# Patient Record
Sex: FEMALE | Race: WHITE | Employment: UNEMPLOYED | URBAN - NONMETROPOLITAN AREA
[De-identification: names, ages, dates, MRNs, and addresses within clinical notes are randomized per-mention and may not be internally consistent; named-entity substitution may affect disease eponyms.]

---

## 2017-12-22 ENCOUNTER — HOSPITAL ENCOUNTER (OUTPATIENT)
Age: 42
Discharge: HOME | End: 2017-12-22
Payer: COMMERCIAL

## 2017-12-22 LAB
CHOLESTEROL/HDL RATIO: 3.2
CHOLESTEROL: 200 MG/DL
ESTIMATED AVERAGE GLUCOSE: 91 MG/DL
HBA1C MFR BLD: 4.8 % (ref 4.8–5.9)
HCT VFR BLD CALC: 43.7 % (ref 36–46)
HDLC SERPL-MCNC: 62 MG/DL
HEMOGLOBIN: 14.3 G/DL (ref 12–16)
LDL CHOLESTEROL: 127 MG/DL (ref 0–130)
MCH RBC QN AUTO: 28.9 PG (ref 26–34)
MCHC RBC AUTO-ENTMCNC: 32.6 G/DL (ref 31–37)
MCV RBC AUTO: 88.7 FL (ref 80–100)
PDW BLD-RTO: 13.4 % (ref 12.1–15.2)
PLATELET # BLD: 237 K/UL (ref 140–450)
PMV BLD AUTO: 7.7 FL (ref 6–12)
RBC # BLD: 4.93 M/UL (ref 4–5.2)
T3 FREE: 2.7 PG/ML (ref 2.02–4.43)
T3 TOTAL: 106 NG/DL (ref 80–200)
T4 TOTAL: 8.1 UG/DL (ref 4.5–12)
THYROXINE, FREE: 1.21 NG/DL (ref 0.93–1.7)
TRIGL SERPL-MCNC: 53 MG/DL
TSH SERPL DL<=0.05 MIU/L-ACNC: 1.46 MIU/L (ref 0.3–5)
VLDLC SERPL CALC-MCNC: ABNORMAL MG/DL (ref 1–30)
WBC # BLD: 6.1 K/UL (ref 3.5–11)

## 2017-12-22 PROCEDURE — 84439 ASSAY OF FREE THYROXINE: CPT

## 2017-12-22 PROCEDURE — 83036 HEMOGLOBIN GLYCOSYLATED A1C: CPT

## 2017-12-22 PROCEDURE — 36415 COLL VENOUS BLD VENIPUNCTURE: CPT

## 2017-12-22 PROCEDURE — 80061 LIPID PANEL: CPT

## 2017-12-22 PROCEDURE — 84481 FREE ASSAY (FT-3): CPT

## 2017-12-22 PROCEDURE — 85027 COMPLETE CBC AUTOMATED: CPT

## 2017-12-22 PROCEDURE — 84443 ASSAY THYROID STIM HORMONE: CPT

## 2017-12-22 PROCEDURE — 84480 ASSAY TRIIODOTHYRONINE (T3): CPT

## 2017-12-22 PROCEDURE — 84436 ASSAY OF TOTAL THYROXINE: CPT

## 2022-07-27 ENCOUNTER — HOSPITAL ENCOUNTER (OUTPATIENT)
Dept: WOMENS IMAGING | Age: 47
Discharge: HOME | End: 2022-07-29
Payer: COMMERCIAL

## 2022-07-27 DIAGNOSIS — Z78.0 MENOPAUSE: ICD-10-CM

## 2022-07-27 DIAGNOSIS — Z12.31 ENCOUNTER FOR SCREENING MAMMOGRAM FOR MALIGNANT NEOPLASM OF BREAST: ICD-10-CM

## 2022-07-27 PROCEDURE — 77080 DXA BONE DENSITY AXIAL: CPT

## 2022-07-27 PROCEDURE — 77063 BREAST TOMOSYNTHESIS BI: CPT

## 2022-08-19 ENCOUNTER — HOSPITAL ENCOUNTER (OUTPATIENT)
Age: 47
Discharge: HOME | End: 2022-08-19
Payer: COMMERCIAL

## 2022-08-19 LAB
CHOLESTEROL/HDL RATIO: 3.5
CHOLESTEROL: 231 MG/DL
HCT VFR BLD CALC: 45.6 % (ref 36.3–47.1)
HDLC SERPL-MCNC: 66 MG/DL
HEMOGLOBIN: 14.7 G/DL (ref 11.9–15.1)
LDL CHOLESTEROL: 147 MG/DL (ref 0–130)
MCH RBC QN AUTO: 29.2 PG (ref 25.2–33.5)
MCHC RBC AUTO-ENTMCNC: 32.2 G/DL (ref 28.4–34.8)
MCV RBC AUTO: 90.7 FL (ref 82.6–102.9)
NRBC AUTOMATED: 0 PER 100 WBC
PDW BLD-RTO: 12.7 % (ref 11.8–14.4)
PLATELET # BLD: 223 K/UL (ref 138–453)
PMV BLD AUTO: 10 FL (ref 8.1–13.5)
RBC # BLD: 5.03 M/UL (ref 3.95–5.11)
TRIGL SERPL-MCNC: 90 MG/DL
TSH SERPL DL<=0.05 MIU/L-ACNC: 2.3 UIU/ML (ref 0.3–5)
VITAMIN D 25-HYDROXY: 29.3 NG/ML
WBC # BLD: 5.9 K/UL (ref 3.5–11.3)

## 2022-08-19 PROCEDURE — 83036 HEMOGLOBIN GLYCOSYLATED A1C: CPT

## 2022-08-19 PROCEDURE — 85027 COMPLETE CBC AUTOMATED: CPT

## 2022-08-19 PROCEDURE — 84443 ASSAY THYROID STIM HORMONE: CPT

## 2022-08-19 PROCEDURE — 80061 LIPID PANEL: CPT

## 2022-08-19 PROCEDURE — 82306 VITAMIN D 25 HYDROXY: CPT

## 2022-08-19 PROCEDURE — 36415 COLL VENOUS BLD VENIPUNCTURE: CPT

## 2022-08-20 ENCOUNTER — HOSPITAL ENCOUNTER (OUTPATIENT)
Age: 47
Setting detail: SPECIMEN
Discharge: HOME | End: 2022-08-20
Payer: COMMERCIAL

## 2022-08-20 LAB
ESTIMATED AVERAGE GLUCOSE: 97 MG/DL
HBA1C MFR BLD: 5 % (ref 4–6)

## 2022-08-20 PROCEDURE — 82272 OCCULT BLD FECES 1-3 TESTS: CPT

## 2022-08-23 LAB
DATE, STOOL #1: NORMAL
HEMOCCULT SP1 STL QL: NEGATIVE
TIME, STOOL #1: NORMAL

## 2022-08-25 ENCOUNTER — HOSPITAL ENCOUNTER (OUTPATIENT)
Age: 47
Setting detail: SPECIMEN
Discharge: HOME | End: 2022-08-25
Payer: COMMERCIAL

## 2022-08-25 PROCEDURE — 82270 OCCULT BLOOD FECES: CPT

## 2022-08-25 PROCEDURE — 82272 OCCULT BLD FECES 1-3 TESTS: CPT

## 2022-08-29 LAB
DATE, STOOL #1: NORMAL
DATE, STOOL #1: NORMAL
HEMOCCULT SP1 STL QL: NEGATIVE
HEMOCCULT SP1 STL QL: NEGATIVE
TIME, STOOL #1: NORMAL
TIME, STOOL #1: NORMAL

## 2022-09-28 ENCOUNTER — OFFICE VISIT (OUTPATIENT)
Dept: VASCULAR SURGERY | Age: 47
End: 2022-09-28
Payer: COMMERCIAL

## 2022-09-28 VITALS
BODY MASS INDEX: 26.13 KG/M2 | TEMPERATURE: 97.5 F | SYSTOLIC BLOOD PRESSURE: 129 MMHG | RESPIRATION RATE: 18 BRPM | HEART RATE: 75 BPM | WEIGHT: 138.4 LBS | HEIGHT: 61 IN | DIASTOLIC BLOOD PRESSURE: 78 MMHG

## 2022-09-28 DIAGNOSIS — I83.813 VARICOSE VEINS OF BILATERAL LOWER EXTREMITIES WITH PAIN: Primary | ICD-10-CM

## 2022-09-28 PROCEDURE — 99204 OFFICE O/P NEW MOD 45 MIN: CPT | Performed by: INTERNAL MEDICINE

## 2022-09-28 RX ORDER — MULTIVIT-MIN/IRON/FOLIC ACID/K 18-600-40
CAPSULE ORAL
COMMUNITY

## 2022-09-28 RX ORDER — CALCIUM CARBONATE 500(1250)
500 TABLET ORAL DAILY
COMMUNITY

## 2022-09-28 NOTE — PROGRESS NOTES
Kassidy Gomez was seen on 9/28/2022 for   Chief Complaint   Patient presents with    New Patient     Varicose veins with pain   .                             REVIEW OF SYSTEMS    Constitutional Weight: absent, A, Fatigue: absent Fever: absent   HEENT Ears: normal,Visual disturbance: absent Sore throat: absent,    Respiratory Shortness of breath: absent, Cough: absent;, Snoring: absent   Cardivascular Chest pain: absent,  Leg pain: present,Leg swelling:absent, Non-healing wound:absent    GI Diarrhea: absent, Abdominal Pain: absent    Urinary frequency: absent, Urinary incontinence: absent   Musculoskeletal Neck pain: absent, Back pain: absent, Restless Leg:absent     Dermatological Hair loss: absent, Skin changes: absent   Neurological  Sudden Loss of Vision in one eye:absent, Slurred Speech:absent, Weakness on one side of body: absent,Headache: absent   Psychiatric Anxiety: absent, Depression: absent   Hematologic Abnormal bleeding: absent,

## 2022-09-28 NOTE — PATIENT INSTRUCTIONS
SURVEY:    You may be receiving a survey from [employer] regarding your visit today. Please complete the survey to enable us to provide the highest quality of care to you and your family. If you cannot score us a very good on any question, please call the office to discuss how we could have made your experience a very good one. Thank you.

## 2022-09-28 NOTE — PROGRESS NOTES
Brayan Brand was seen on 9/28/2022 for   Chief Complaint   Patient presents with    New Patient     Varicose veins with pain   . 9/28/22 Here today on 1st [facility] Vascular visit for varicose veins. Referred by Ilene Reagan MD Ob Gyn. Many years. Since 20's. Veins got more prominent with pregnancies. Wore support stockings. 3 kids. Mother had vv, but not treated. One of her 4 sisters also has vv. Legs ache after prolonged standing. Doesn't interfere with sleep. No abdominal injury or surgery. No hx of blood clot. Other family members have had. General health has been very good. Farm family.     Social History     Socioeconomic History    Marital status:      Spouse name: Not on file    Number of children: Not on file    Years of education: Not on file    Highest education level: Not on file   Occupational History    Not on file   Tobacco Use    Smoking status: Never    Smokeless tobacco: Never   Vaping Use    Vaping Use: Never used   Substance and Sexual Activity    Alcohol use: Yes     Comment: occasionally    Drug use: Never    Sexual activity: Not on file   Other Topics Concern    Not on file   Social History Narrative    Not on file     Social Determinants of Health     Financial Resource Strain: Not on file   Food Insecurity: Not on file   Transportation Needs: Not on file   Physical Activity: Not on file   Stress: Not on file   Social Connections: Not on file   Intimate Partner Violence: Not on file   Housing Stability: Not on file     Family History   Problem Relation Age of Onset    Cancer Mother     High Blood Pressure Mother     High Blood Pressure Father     Heart Attack Father      Past Medical History:   Diagnosis Date    Antithrombin deficiency [facility])      Current Outpatient Medications   Medication Sig Dispense Refill    calcium carbonate (OSCAL) 500 MG TABS tablet Take 500 mg by mouth daily      Cholecalciferol (VITAMIN D) 50 MCG (2000 UT) CAPS capsule Take by mouth       No current facility-administered medications for this visit. Physical findings:  General- no acute distress, oriented  HEENT - no xanthelasma, external ears normal  Neck- Supple, no thyromegaly  Carotids - no carotid bruits  Lungs - Clear to auscultation. CV- Regular rate and rythym, no murmurs rubs or gallops  Abdomen - Non tender, non distended, no bruits  Skin- warm, dry, no skin breakdown or gangrene  Extremities - no edema, varicose veins both anterior legs, some cluster posterior leg, some upper medial thigh    Pulses Right - Radial 2+  Posterior tibial:    2+  Dorsalis pedis:  2+     Pulses Left -Radial 2+  Posterior tibial:    2+  Dorsalis pedis:  2+      Assessment:  1. Varicose veins of bilateral lower extremities with pain         Plan of care:  Bilateral duplex  Rtc 3 wks   45 min chart review and pt encounter  Follow up and evaluate.        Electronically signed by Williams Conteh MD on 9/28/22 at 3:24 PM EDT

## 2022-10-06 ENCOUNTER — HOSPITAL ENCOUNTER (OUTPATIENT)
Dept: VASCULAR LAB | Age: 47
Discharge: HOME | End: 2022-10-08
Payer: COMMERCIAL

## 2022-10-06 DIAGNOSIS — I83.813 VARICOSE VEINS OF BILATERAL LOWER EXTREMITIES WITH PAIN: ICD-10-CM

## 2022-10-06 PROCEDURE — 93970 EXTREMITY STUDY: CPT

## 2022-10-26 ENCOUNTER — OFFICE VISIT (OUTPATIENT)
Dept: VASCULAR SURGERY | Age: 47
End: 2022-10-26
Payer: COMMERCIAL

## 2022-10-26 VITALS
DIASTOLIC BLOOD PRESSURE: 67 MMHG | HEART RATE: 79 BPM | TEMPERATURE: 97 F | SYSTOLIC BLOOD PRESSURE: 110 MMHG | HEIGHT: 61 IN | RESPIRATION RATE: 18 BRPM | BODY MASS INDEX: 26.38 KG/M2 | WEIGHT: 139.7 LBS

## 2022-10-26 DIAGNOSIS — I83.813 VARICOSE VEINS OF BILATERAL LOWER EXTREMITIES WITH PAIN: Primary | ICD-10-CM

## 2022-10-26 PROCEDURE — 99214 OFFICE O/P EST MOD 30 MIN: CPT | Performed by: INTERNAL MEDICINE

## 2022-10-26 NOTE — PROGRESS NOTES
Apoorva Marcos was seen on 10/26/2022 for   Chief Complaint   Patient presents with    Follow-up     Results of US   .                             REVIEW OF SYSTEMS    Constitutional Weight: absent, A, Fatigue: absent Fever: absent   HEENT Ears: normal,Visual disturbance: absent Sore throat: absent,    Respiratory Shortness of breath: absent, Cough: absent;, Snoring: absent   Cardivascular Chest pain: absent,  Leg pain: present,Leg swelling:absent, Non-healing wound:absent    GI Diarrhea: absent, Abdominal Pain: absent    Urinary frequency: absent, Urinary incontinence: absent   Musculoskeletal Neck pain: absent, Back pain: absent, Restless Leg:absent     Dermatological Hair loss: absent, Skin changes: absent   Neurological  Sudden Loss of Vision in one eye:absent, Slurred Speech:absent, Weakness on one side of body: absent,Headache: absent   Psychiatric Anxiety: absent, Depression: absent   Hematologic Abnormal bleeding: absent,

## 2022-10-26 NOTE — PROGRESS NOTES
Susan García was seen on 10/26/2022 for   Chief Complaint   Patient presents with    Follow-up     Results of US   .9/28/22 Here today on 1st [facility] Vascular visit for varicose veins. Referred by Marlene Nelson MD Ob Gyn. Many years. Since 20's. Veins got more prominent with pregnancies. Wore support stockings. 3 kids. Mother had vv, but not treated. One of her 4 sisters also has vv. Legs ache after prolonged standing. Doesn't interfere with sleep. No abdominal injury or surgery. No hx of blood clot. Other family members have had. General health has been very good. Farm family. UPDATE 10/26/22 No changes since last visit. Duplex showed bilateral vv and reflux, with left gsv supplying vv in lower leg, with reflux.        Social History     Socioeconomic History    Marital status:      Spouse name: Not on file    Number of children: Not on file    Years of education: Not on file    Highest education level: Not on file   Occupational History    Not on file   Tobacco Use    Smoking status: Never    Smokeless tobacco: Never   Vaping Use    Vaping Use: Never used   Substance and Sexual Activity    Alcohol use: Yes     Comment: occasionally    Drug use: Never    Sexual activity: Not on file   Other Topics Concern    Not on file   Social History Narrative    Not on file     Social Determinants of Health     Financial Resource Strain: Not on file   Food Insecurity: Not on file   Transportation Needs: Not on file   Physical Activity: Not on file   Stress: Not on file   Social Connections: Not on file   Intimate Partner Violence: Not on file   Housing Stability: Not on file     Family History   Problem Relation Age of Onset    Cancer Mother     High Blood Pressure Mother     High Blood Pressure Father     Heart Attack Father      Past Medical History:   Diagnosis Date    Antithrombin deficiency [facility])      Current Outpatient Medications   Medication Sig Dispense Refill    calcium carbonate (OSCAL) 500 MG TABS tablet Take 500 mg by mouth daily      Cholecalciferol (VITAMIN D) 50 MCG (2000 UT) CAPS capsule Take by mouth       No current facility-administered medications for this visit. Physical findings:  General- no acute distress, oriented  HEENT - no xanthelasma, external ears normal  Neck- Supple, no thyromegaly  Skin- warm, dry, no skin breakdown or gangrene  Extremities - no edema. Prashanth vv        Assessment:  1. Varicose veins of bilateral lower extremities with pain     Bilateral symptomatic reflux interfering with adl, not responding to compression      Plan of care: Will start with left gsv venaseal (rf ok if not approved) and supplemental foam if needed. Will then plan on right leg later  30 min chart review and pt encounter  Follow up and evaluate.        Electronically signed by Dale Lewis MD on 10/26/22 at 9:12 AM EDT

## 2022-10-26 NOTE — PATIENT INSTRUCTIONS
SURVEY:    You may be receiving a survey from [employer] regarding your visit today. Please complete the survey to enable us to provide the highest quality of care to you and your family. If you cannot score us a very good on any question, please call the office to discuss how we could have made your experience a very good one. Thank you.

## 2022-11-15 ENCOUNTER — APPOINTMENT (OUTPATIENT)
Dept: VASCULAR LAB | Age: 47
End: 2022-11-15
Attending: INTERNAL MEDICINE
Payer: COMMERCIAL

## 2022-11-15 ENCOUNTER — HOSPITAL ENCOUNTER (OUTPATIENT)
Dept: INTERVENTIONAL RADIOLOGY/VASCULAR | Age: 47
Discharge: HOME | End: 2022-11-15
Attending: INTERNAL MEDICINE | Admitting: INTERNAL MEDICINE
Payer: COMMERCIAL

## 2022-11-15 VITALS
RESPIRATION RATE: 16 BRPM | SYSTOLIC BLOOD PRESSURE: 114 MMHG | DIASTOLIC BLOOD PRESSURE: 71 MMHG | OXYGEN SATURATION: 95 % | HEART RATE: 80 BPM | TEMPERATURE: 98.4 F

## 2022-11-15 DIAGNOSIS — M79.662 PAIN OF LEFT LOWER LEG: Primary | ICD-10-CM

## 2022-11-15 PROCEDURE — C1894 INTRO/SHEATH, NON-LASER: HCPCS

## 2022-11-15 PROCEDURE — 2500000003 HC RX 250 WO HCPCS: Performed by: INTERNAL MEDICINE

## 2022-11-15 PROCEDURE — 64999 UNLISTED PX NERVOUS SYSTEM: CPT

## 2022-11-15 PROCEDURE — 2709999900 HC NON-CHARGEABLE SUPPLY

## 2022-11-15 PROCEDURE — C1888 ENDOVAS NON-CARDIAC ABL CATH: HCPCS

## 2022-11-15 PROCEDURE — 2500000003 HC RX 250 WO HCPCS

## 2022-11-15 PROCEDURE — 2580000003 HC RX 258: Performed by: INTERNAL MEDICINE

## 2022-11-15 RX ORDER — LIDOCAINE HYDROCHLORIDE 10 MG/ML
INJECTION, SOLUTION EPIDURAL; INFILTRATION; INTRACAUDAL; PERINEURAL
Status: COMPLETED | OUTPATIENT
Start: 2022-11-15 | End: 2022-11-15

## 2022-11-15 RX ADMIN — LIDOCAINE HYDROCHLORIDE,EPINEPHRINE BITARTRATE: 10; .01 INJECTION, SOLUTION INFILTRATION; PERINEURAL at 09:43

## 2022-11-15 RX ADMIN — LIDOCAINE HYDROCHLORIDE 6 ML: 10 INJECTION, SOLUTION EPIDURAL; INFILTRATION; INTRACAUDAL; PERINEURAL at 09:38

## 2022-11-15 NOTE — H&P
.9/28/22 Here today on 1st [facility] Vascular visit for varicose veins. Referred by David Hanson MD Ob Gyn. Many years. Since 20's. Veins got more prominent with pregnancies. Wore support stockings. 3 kids. Mother had vv, but not treated. One of her 4 sisters also has vv. Legs ache after prolonged standing. Doesn't interfere with sleep. No abdominal injury or surgery. No hx of blood clot. Other family members have had. General health has been very good. Farm family. UPDATE 10/26/22 No changes since last visit. Duplex showed bilateral vv and reflux, with left gsv supplying vv in lower leg, with reflux.          Social History               Socioeconomic History    Marital status:        Spouse name: Not on file    Number of children: Not on file    Years of education: Not on file    Highest education level: Not on file   Occupational History    Not on file   Tobacco Use    Smoking status: Never    Smokeless tobacco: Never   Vaping Use    Vaping Use: Never used   Substance and Sexual Activity    Alcohol use: Yes       Comment: occasionally    Drug use: Never    Sexual activity: Not on file   Other Topics Concern    Not on file   Social History Narrative    Not on file      Social Determinants of Health      Financial Resource Strain: Not on file   Food Insecurity: Not on file   Transportation Needs: Not on file   Physical Activity: Not on file   Stress: Not on file   Social Connections: Not on file   Intimate Partner Violence: Not on file   Housing Stability: Not on file         Family History         Family History   Problem Relation Age of Onset    Cancer Mother      High Blood Pressure Mother      High Blood Pressure Father      Heart Attack Father           Past Medical History        Past Medical History:   Diagnosis Date    Antithrombin deficiency [facility])           Current Facility-Administered Medications          Current Outpatient Medications   Medication Sig Dispense Refill    calcium carbonate (OSCAL) 500 MG TABS tablet Take 500 mg by mouth daily        Cholecalciferol (VITAMIN D) 50 MCG (2000 UT) CAPS capsule Take by mouth          No current facility-administered medications for this visit. Physical findings:  General- no acute distress, oriented  HEENT - no xanthelasma, external ears normal  Neck- Supple, no thyromegaly  Skin- warm, dry, no skin breakdown or gangrene  Extremities - no edema. Prashanth vv           Assessment:  1.  Varicose veins of bilateral lower extremities with pain     Bilateral symptomatic reflux interfering with adl, not responding to compression        Plan of care: Left gsv RF ablation  No changes since prior exam  Yao Joya MD

## 2022-11-15 NOTE — DISCHARGE INSTRUCTIONS
Radiofrequency Sclerotherapy: After Your Procedure  What is sclerotherapy? Sclerotherapy is a treatment to get rid of varicose veins. A chemical is injected along the vessel to numb the area and then radiofrequency treatment is completed. This causes the vein to close. The procedure can take up to 30 minutes. You should be able to walk on your own after the treatment. Follow-up care is a key part of your treatment and safety. Be sure to make and go to all appointments, and call your doctor if you are having problems. It's also a good idea to know your test results and keep a list of the medicines you take. Going home  Be sure you have someone to drive you home. You will be able to return to normal activity as tolerated. Walking is encouraged at regular intervals throughout the day for at least a total of 30 minutes per day. Avoid long periods of sitting or standing. Refrain from strenuous activities or heavy lifting for several days. Resume a regular diet      Wear compression stocking for 1-2 weeks. If necessary you may shower leaving compression hose on and drying thoroughly with hair dryer. If change of stocking is needed, remove soiled stocking and apply clean stocking immediately. Take analgesics as needed for pain. Watch for signs of infection such as:    redness, swelling, drainage and fever. Please notify doctor of these. Follow up vascular studies in 1-2 weeks. When should you call your doctor? You have questions or concerns.

## 2022-11-15 NOTE — PROGRESS NOTES
Discharge instructions reviewed and voices understanding. Vital signs stable. Dressed for home Amb[employer] off department unaided, all belongings sent with patient.

## 2022-11-15 NOTE — SEDATION DOCUMENTATION
Leg cleaned and band aide applied. [place] website --- [email or web address] Website --- [email or web address]

## 2022-11-23 ENCOUNTER — HOSPITAL ENCOUNTER (OUTPATIENT)
Dept: VASCULAR LAB | Age: 47
Discharge: HOME | End: 2022-11-25
Payer: COMMERCIAL

## 2022-11-23 ENCOUNTER — OFFICE VISIT (OUTPATIENT)
Dept: VASCULAR SURGERY | Age: 47
End: 2022-11-23
Payer: COMMERCIAL

## 2022-11-23 VITALS
HEIGHT: 62 IN | RESPIRATION RATE: 16 BRPM | DIASTOLIC BLOOD PRESSURE: 74 MMHG | HEART RATE: 67 BPM | TEMPERATURE: 97.5 F | WEIGHT: 136.5 LBS | SYSTOLIC BLOOD PRESSURE: 131 MMHG | BODY MASS INDEX: 25.12 KG/M2

## 2022-11-23 DIAGNOSIS — I83.813 VARICOSE VEINS OF BILATERAL LOWER EXTREMITIES WITH PAIN: Primary | ICD-10-CM

## 2022-11-23 DIAGNOSIS — M79.662 PAIN OF LEFT LOWER LEG: ICD-10-CM

## 2022-11-23 PROCEDURE — 99215 OFFICE O/P EST HI 40 MIN: CPT | Performed by: INTERNAL MEDICINE

## 2022-11-23 PROCEDURE — 93971 EXTREMITY STUDY: CPT

## 2022-11-23 NOTE — PATIENT INSTRUCTIONS
SURVEY:    You may be receiving a survey from [employer] regarding your visit today. Please complete the survey to enable us to provide the highest quality of care to you and your family. If you cannot score us a very good on any question, please call the office to discuss how we could have made your experience a very good one. Thank you.

## 2022-11-23 NOTE — PROGRESS NOTES
Saira Sullivan was seen on 11/23/2022 for   Chief Complaint   Patient presents with    Follow-up     From left leg venaseal.. u/s completed this am. Reports discomfort on her left shin , ALTON stocking in place   . 9/28/22 Here today on 1st [facility] Vascular visit for varicose veins. Referred by Judy Freire MD Ob Gyn. Many years. Since 20's. Veins got more prominent with pregnancies. Wore support stockings. 3 kids. Mother had vv, but not treated. One of her 4 sisters also has vv. Legs ache after prolonged standing. Doesn't interfere with sleep. No abdominal injury or surgery. No hx of blood clot. Other family members have had. General health has been very good. Farm family. UPDATE 10/26/22 No changes since last visit. Duplex showed bilateral vv and reflux, with left gsv supplying vv in lower leg, with reflux. UPDATE 11/23/22 On 11/15/22 she had RF left leg with above knee puncture. Duplex today shows that treated segment is occluded, but distal veins still patent and she reports pain in those areas. Right leg still bothers her and she has veins behind knee that are painful.  Prior duplex showed shin veins approx 2.5 mm diameter      Social History     Socioeconomic History    Marital status:      Spouse name: Not on file    Number of children: Not on file    Years of education: Not on file    Highest education level: Not on file   Occupational History    Not on file   Tobacco Use    Smoking status: Never    Smokeless tobacco: Never   Vaping Use    Vaping Use: Never used   Substance and Sexual Activity    Alcohol use: Yes     Comment: occasionally    Drug use: Never    Sexual activity: Not on file   Other Topics Concern    Not on file   Social History Narrative    Not on file     Social Determinants of Health     Financial Resource Strain: Not on file   Food Insecurity: Not on file   Transportation Needs: Not on file   Physical Activity: Not on file   Stress: Not on file   Social Connections: Not on file Intimate Partner Violence: Not on file   Housing Stability: Not on file     Family History   Problem Relation Age of Onset    Cancer Mother     High Blood Pressure Mother     High Blood Pressure Father     Heart Attack Father      Past Medical History:   Diagnosis Date    Antithrombin deficiency [facility])      Current Outpatient Medications   Medication Sig Dispense Refill    calcium carbonate (OSCAL) 500 MG TABS tablet Take 500 mg by mouth daily      Cholecalciferol (VITAMIN D) 50 MCG (2000 UT) CAPS capsule Take by mouth       No current facility-administered medications for this visit. Physical findings:  General- no acute distress, oriented  HEENT - no xanthelasma, external ears normal  Neck- Supple, no thyromegaly  Carotids - no carotid bruits  Lungs - Clear to auscultation. CV- Regular rate and rythym, no murmurs rubs or gallops  Abdomen - Non tender, non distended, no bruits  Skin- warm, dry, no skin breakdown or gangrene. Vv both shins  Extremities - no edema      Assessment:  1. Varicose veins of bilateral lower extremities with pain     Ongoing sx from refluxing veins, both legs, refractory to compression and interfering with adl    Follow up and evaluate.    12/6/22 left gsv foam and rgsv rf  Rtc   40 min chart reivew and pt encounter    Electronically signed by Alphonso Mckenzie MD on 11/23/22 at 10:16 AM EST

## 2022-12-13 ENCOUNTER — HOSPITAL ENCOUNTER (OUTPATIENT)
Dept: INTERVENTIONAL RADIOLOGY/VASCULAR | Age: 47
Discharge: HOME | End: 2022-12-15
Payer: COMMERCIAL

## 2022-12-13 ENCOUNTER — HOSPITAL ENCOUNTER (OUTPATIENT)
Age: 47
Discharge: HOME | End: 2022-12-13
Payer: COMMERCIAL

## 2022-12-13 ENCOUNTER — HOSPITAL ENCOUNTER (OUTPATIENT)
Dept: VASCULAR LAB | Age: 47
Discharge: HOME | End: 2022-12-15
Payer: COMMERCIAL

## 2022-12-13 VITALS
BODY MASS INDEX: 25.68 KG/M2 | HEIGHT: 61 IN | SYSTOLIC BLOOD PRESSURE: 142 MMHG | RESPIRATION RATE: 12 BRPM | OXYGEN SATURATION: 98 % | DIASTOLIC BLOOD PRESSURE: 72 MMHG | WEIGHT: 136 LBS | TEMPERATURE: 98.6 F | HEART RATE: 72 BPM

## 2022-12-13 DIAGNOSIS — I83.813 VARICOSE VEINS OF BILATERAL LOWER EXTREMITIES WITH PAIN: ICD-10-CM

## 2022-12-13 DIAGNOSIS — I83.819 VARICOSE VEINS WITH PAIN: ICD-10-CM

## 2022-12-13 DIAGNOSIS — M79.661 PAIN OF RIGHT LOWER LEG: Primary | ICD-10-CM

## 2022-12-13 LAB
CHOLESTEROL/HDL RATIO: 3.3
CHOLESTEROL: 203 MG/DL
HDLC SERPL-MCNC: 62 MG/DL
LDL CHOLESTEROL: 123 MG/DL (ref 0–130)
TRIGL SERPL-MCNC: 89 MG/DL

## 2022-12-13 PROCEDURE — 36475 ENDOVENOUS RF 1ST VEIN: CPT

## 2022-12-13 PROCEDURE — 2709999900 IR ULTRASOUND GUIDANCE VASCULAR ACCESS

## 2022-12-13 PROCEDURE — 2580000003 HC RX 258: Performed by: INTERNAL MEDICINE

## 2022-12-13 PROCEDURE — 36415 COLL VENOUS BLD VENIPUNCTURE: CPT

## 2022-12-13 PROCEDURE — 2500000003 HC RX 250 WO HCPCS: Performed by: INTERNAL MEDICINE

## 2022-12-13 PROCEDURE — 2500000003 HC RX 250 WO HCPCS

## 2022-12-13 PROCEDURE — 80061 LIPID PANEL: CPT

## 2022-12-13 RX ORDER — LIDOCAINE HYDROCHLORIDE 10 MG/ML
INJECTION, SOLUTION EPIDURAL; INFILTRATION; INTRACAUDAL; PERINEURAL
Status: COMPLETED | OUTPATIENT
Start: 2022-12-13 | End: 2022-12-13

## 2022-12-13 RX ADMIN — SODIUM BICARBONATE: 84 INJECTION, SOLUTION INTRAVENOUS at 09:03

## 2022-12-13 RX ADMIN — LIDOCAINE HYDROCHLORIDE 2 ML: 10 INJECTION, SOLUTION EPIDURAL; INFILTRATION; INTRACAUDAL; PERINEURAL at 08:57

## 2022-12-13 RX ADMIN — LIDOCAINE HYDROCHLORIDE 4 ML: 10 INJECTION, SOLUTION EPIDURAL; INFILTRATION; INTRACAUDAL; PERINEURAL at 08:59

## 2022-12-13 ASSESSMENT — PAIN - FUNCTIONAL ASSESSMENT: PAIN_FUNCTIONAL_ASSESSMENT: NONE - DENIES PAIN

## 2022-12-13 NOTE — SEDATION DOCUMENTATION
Pt given discharge instructions. Pt voices an understanding of these. Pt dressed and left ambulatory.

## 2022-12-13 NOTE — PROCEDURES
[address], [place] [address]                            CARDIAC CATHETERIZATION    PATIENT NAME: Washington Sacks                   :        1975  MED REC NO:   [id number]                              ROOM:  ACCOUNT NO:   [de-identified]                           ADMIT DATE: 2022  PROVIDER:     Riky Alaniz    DATE OF PROCEDURE:  2022    FINAL IMPRESSION:  Successful right great saphenous vein radiofrequency  ablation. ESTIMATED BLOOD LOSS:  2 mL. PROCEDURE:  Ultrasound-guided right great saphenous vein radiofrequency  ablation. METHOD:  Written informed consent was obtained. The right great  saphenous vein was identified at a location just distal to the knee. Using ultrasound guidance and micropuncture technique, the vein was  entered and a 7-Scottish sheath was introduced. The radiofrequency probe  was then introduced and placed with its tip 2.5 cm from the  saphenofemoral junction. Tumescent anesthesia was then applied. The  vein was treated according to the IFU. The sheath was withdrawn. Adequate hemostasis was achieved. There were no complications. COMMENT:  The patient is a 40-year-old female with venous reflux  interfering with activities of daily life. The current procedure  treated the right great saphenous vein. She will be managed with  compression and outpatient followup.         Dieter Cruz    D: 2022 9:25:04       T: 2022 9:28:43     [email or web address]  Job#: [id number]     Doc#: [id number]    CC:

## 2022-12-21 ENCOUNTER — OFFICE VISIT (OUTPATIENT)
Dept: VASCULAR SURGERY | Age: 47
End: 2022-12-21
Payer: COMMERCIAL

## 2022-12-21 ENCOUNTER — HOSPITAL ENCOUNTER (OUTPATIENT)
Dept: VASCULAR LAB | Age: 47
Discharge: HOME | End: 2022-12-23
Payer: COMMERCIAL

## 2022-12-21 VITALS
WEIGHT: 133.9 LBS | DIASTOLIC BLOOD PRESSURE: 73 MMHG | HEIGHT: 62 IN | BODY MASS INDEX: 24.64 KG/M2 | TEMPERATURE: 97.5 F | HEART RATE: 79 BPM | SYSTOLIC BLOOD PRESSURE: 114 MMHG | RESPIRATION RATE: 18 BRPM

## 2022-12-21 DIAGNOSIS — I83.813 VARICOSE VEINS OF BILATERAL LOWER EXTREMITIES WITH PAIN: Primary | ICD-10-CM

## 2022-12-21 DIAGNOSIS — M79.661 PAIN OF RIGHT LOWER LEG: ICD-10-CM

## 2022-12-21 PROCEDURE — 93971 EXTREMITY STUDY: CPT

## 2022-12-21 PROCEDURE — 99213 OFFICE O/P EST LOW 20 MIN: CPT | Performed by: INTERNAL MEDICINE

## 2022-12-21 NOTE — PROGRESS NOTES
Susan García was seen on 12/21/2022 for   Chief Complaint   Patient presents with    Follow-up     Post procedure follow up   .                             REVIEW OF SYSTEMS    Constitutional Weight: absent, A, Fatigue: absent Fever: absent   HEENT Ears: normal,Visual disturbance: absent Sore throat: absent,    Respiratory Shortness of breath: absent, Cough: absent;, Snoring: absent   Cardivascular Chest pain: absent,  Leg pain: absent,Leg swelling:absent, Non-healing wound:absent    GI Diarrhea: absent, Abdominal Pain: absent    Urinary frequency: absent, Urinary incontinence: absent   Musculoskeletal Neck pain: absent, Back pain: absent, Restless Leg:absent     Dermatological Hair loss: absent, Skin changes: absent   Neurological  Sudden Loss of Vision in one eye:absent, Slurred Speech:absent, Weakness on one side of body: absent,Headache: absent   Psychiatric Anxiety: absent, Depression: absent   Hematologic Abnormal bleeding: absent,

## 2022-12-21 NOTE — PATIENT INSTRUCTIONS
SURVEY:    You may be receiving a survey from [employer] regarding your visit today. Please complete the survey to enable us to provide the highest quality of care to you and your family. If you cannot score us a very good on any question, please call the office to discuss how we could have made your experience a very good one. Thank you.

## 2022-12-21 NOTE — PROGRESS NOTES
Germaine Velazquez was seen on 12/21/2022 for   Chief Complaint   Patient presents with    Follow-up     Post procedure follow up   .  9/28/22 Here today on 1st [facility] Vascular visit for varicose veins. Referred by Zenaida Curtis MD Ob Gyn. Many years. Since 20's. Veins got more prominent with pregnancies. Wore support stockings. 3 kids. Mother had vv, but not treated. One of her 4 sisters also has vv. Legs ache after prolonged standing. Doesn't interfere with sleep. No abdominal injury or surgery. No hx of blood clot. Other family members have had. General health has been very good. Farm family. UPDATE 10/26/22 No changes since last visit. Duplex showed bilateral vv and reflux, with left gsv supplying vv in lower leg, with reflux. UPDATE 11/23/22 On 11/15/22 she had RF left leg with above knee puncture. Duplex today shows that treated segment is occluded, but distal veins still patent and she reports pain in those areas. Right leg still bothers her and she has veins behind knee that are painful. Prior duplex showed shin veins approx 2.5 mm diameter  UPDATE 12/21/22 On 12/13/22 had RGSV RF. Has done well since.      Social History     Socioeconomic History    Marital status:      Spouse name: Not on file    Number of children: Not on file    Years of education: Not on file    Highest education level: Not on file   Occupational History    Not on file   Tobacco Use    Smoking status: Never    Smokeless tobacco: Never   Vaping Use    Vaping Use: Never used   Substance and Sexual Activity    Alcohol use: Yes     Comment: occasionally    Drug use: Never    Sexual activity: Not on file   Other Topics Concern    Not on file   Social History Narrative    Not on file     Social Determinants of Health     Financial Resource Strain: Not on file   Food Insecurity: Not on file   Transportation Needs: Not on file   Physical Activity: Not on file   Stress: Not on file   Social Connections: Not on file   Intimate Partner Violence: Not on file   Housing Stability: Not on file     Family History   Problem Relation Age of Onset    Cancer Mother     High Blood Pressure Mother     High Blood Pressure Father     Heart Attack Father      Past Medical History:   Diagnosis Date    Antithrombin deficiency [facility])      Current Outpatient Medications   Medication Sig Dispense Refill    calcium carbonate (OSCAL) 500 MG TABS tablet Take 500 mg by mouth daily      Cholecalciferol (VITAMIN D) 50 MCG (2000 UT) CAPS capsule Take by mouth       No current facility-administered medications for this visit. Physical findings:  General- no acute distress, oriented  HEENT - no xanthelasma, external ears normal  Neck- Supple, no thyromegaly  Skin- warm, dry, no skin breakdown or gangrene  Extremities - no edema. Distended vein left anterior leg below knee        Assessment:  1. Varicose veins of bilateral lower extremities with pain     Successful sclerotherapy  Still has distal varicose veins      Plan of care: She will assess coverage for foam and consider that in 2023  Rtc march  I will see if I can determine cost of sts  20 min chart reivew and pt encounter  Follow up and evaluate.        Electronically signed by Brenda Watts MD on 12/21/22 at 1:51 PM EST

## 2024-03-12 ENCOUNTER — HOSPITAL ENCOUNTER (OUTPATIENT)
Dept: WOMENS IMAGING | Age: 49
Discharge: HOME | End: 2024-03-14
Payer: COMMERCIAL

## 2024-03-12 DIAGNOSIS — Z12.31 ENCOUNTER FOR SCREENING MAMMOGRAM FOR MALIGNANT NEOPLASM OF BREAST: ICD-10-CM

## 2024-03-12 PROCEDURE — 77063 BREAST TOMOSYNTHESIS BI: CPT
